# Patient Record
Sex: MALE | Race: WHITE | Employment: OTHER | URBAN - METROPOLITAN AREA
[De-identification: names, ages, dates, MRNs, and addresses within clinical notes are randomized per-mention and may not be internally consistent; named-entity substitution may affect disease eponyms.]

---

## 2024-05-31 ENCOUNTER — APPOINTMENT (OUTPATIENT)
Dept: CARDIOLOGY | Facility: HOSPITAL | Age: 79
End: 2024-05-31
Payer: MEDICARE

## 2024-05-31 ENCOUNTER — APPOINTMENT (OUTPATIENT)
Dept: RADIOLOGY | Facility: HOSPITAL | Age: 79
End: 2024-05-31
Payer: MEDICARE

## 2024-05-31 ENCOUNTER — HOSPITAL ENCOUNTER (EMERGENCY)
Facility: HOSPITAL | Age: 79
Discharge: OTHER INSTITUTION | End: 2024-06-02
Attending: EMERGENCY MEDICINE
Payer: MEDICARE

## 2024-05-31 DIAGNOSIS — Z96.649 PERIPROSTHETIC FRACTURE OF FEMUR FOLLOWING TOTAL REPLACEMENT OF HIP, INITIAL ENCOUNTER: Primary | ICD-10-CM

## 2024-05-31 DIAGNOSIS — M97.8XXA PERIPROSTHETIC FRACTURE OF FEMUR FOLLOWING TOTAL REPLACEMENT OF HIP, INITIAL ENCOUNTER: Primary | ICD-10-CM

## 2024-05-31 LAB
ANION GAP SERPL CALC-SCNC: 11 MMOL/L (ref 10–20)
BASOPHILS # BLD AUTO: 0.01 X10*3/UL (ref 0–0.1)
BASOPHILS NFR BLD AUTO: 0.3 %
BUN SERPL-MCNC: 30 MG/DL (ref 6–23)
CALCIUM SERPL-MCNC: 8.9 MG/DL (ref 8.6–10.3)
CHLORIDE SERPL-SCNC: 103 MMOL/L (ref 98–107)
CO2 SERPL-SCNC: 22 MMOL/L (ref 21–32)
CREAT SERPL-MCNC: 1.38 MG/DL (ref 0.5–1.3)
EGFRCR SERPLBLD CKD-EPI 2021: 53 ML/MIN/1.73M*2
EOSINOPHIL # BLD AUTO: 0.07 X10*3/UL (ref 0–0.4)
EOSINOPHIL NFR BLD AUTO: 2.3 %
ERYTHROCYTE [DISTWIDTH] IN BLOOD BY AUTOMATED COUNT: 13.2 % (ref 11.5–14.5)
GLUCOSE SERPL-MCNC: 128 MG/DL (ref 74–99)
HCT VFR BLD AUTO: 27.4 % (ref 41–52)
HGB BLD-MCNC: 9.6 G/DL (ref 13.5–17.5)
IMM GRANULOCYTES # BLD AUTO: 0.02 X10*3/UL (ref 0–0.5)
IMM GRANULOCYTES NFR BLD AUTO: 0.7 % (ref 0–0.9)
INR PPP: 1.1 (ref 0.9–1.1)
LYMPHOCYTES # BLD AUTO: 0.61 X10*3/UL (ref 0.8–3)
LYMPHOCYTES NFR BLD AUTO: 19.9 %
MCH RBC QN AUTO: 33.3 PG (ref 26–34)
MCHC RBC AUTO-ENTMCNC: 35 G/DL (ref 32–36)
MCV RBC AUTO: 95 FL (ref 80–100)
MONOCYTES # BLD AUTO: 0.28 X10*3/UL (ref 0.05–0.8)
MONOCYTES NFR BLD AUTO: 9.1 %
NEUTROPHILS # BLD AUTO: 2.08 X10*3/UL (ref 1.6–5.5)
NEUTROPHILS NFR BLD AUTO: 67.7 %
NRBC BLD-RTO: 0 /100 WBCS (ref 0–0)
PLATELET # BLD AUTO: 107 X10*3/UL (ref 150–450)
POTASSIUM SERPL-SCNC: 4.4 MMOL/L (ref 3.5–5.3)
PROTHROMBIN TIME: 12.2 SECONDS (ref 9.8–12.8)
RBC # BLD AUTO: 2.88 X10*6/UL (ref 4.5–5.9)
SODIUM SERPL-SCNC: 132 MMOL/L (ref 136–145)
WBC # BLD AUTO: 3.1 X10*3/UL (ref 4.4–11.3)

## 2024-05-31 PROCEDURE — 72170 X-RAY EXAM OF PELVIS: CPT | Performed by: RADIOLOGY

## 2024-05-31 PROCEDURE — 70450 CT HEAD/BRAIN W/O DYE: CPT | Performed by: RADIOLOGY

## 2024-05-31 PROCEDURE — 2500000004 HC RX 250 GENERAL PHARMACY W/ HCPCS (ALT 636 FOR OP/ED): Performed by: EMERGENCY MEDICINE

## 2024-05-31 PROCEDURE — 72125 CT NECK SPINE W/O DYE: CPT | Performed by: RADIOLOGY

## 2024-05-31 PROCEDURE — 2500000004 HC RX 250 GENERAL PHARMACY W/ HCPCS (ALT 636 FOR OP/ED): Performed by: STUDENT IN AN ORGANIZED HEALTH CARE EDUCATION/TRAINING PROGRAM

## 2024-05-31 PROCEDURE — 99291 CRITICAL CARE FIRST HOUR: CPT | Performed by: EMERGENCY MEDICINE

## 2024-05-31 PROCEDURE — 93005 ELECTROCARDIOGRAM TRACING: CPT

## 2024-05-31 PROCEDURE — 99291 CRITICAL CARE FIRST HOUR: CPT | Mod: 25

## 2024-05-31 PROCEDURE — 2500000005 HC RX 250 GENERAL PHARMACY W/O HCPCS: Performed by: EMERGENCY MEDICINE

## 2024-05-31 PROCEDURE — 85025 COMPLETE CBC W/AUTO DIFF WBC: CPT | Performed by: EMERGENCY MEDICINE

## 2024-05-31 PROCEDURE — 80048 BASIC METABOLIC PNL TOTAL CA: CPT | Performed by: EMERGENCY MEDICINE

## 2024-05-31 PROCEDURE — 36415 COLL VENOUS BLD VENIPUNCTURE: CPT | Performed by: EMERGENCY MEDICINE

## 2024-05-31 PROCEDURE — 73552 X-RAY EXAM OF FEMUR 2/>: CPT | Performed by: RADIOLOGY

## 2024-05-31 PROCEDURE — 2500000004 HC RX 250 GENERAL PHARMACY W/ HCPCS (ALT 636 FOR OP/ED)

## 2024-05-31 PROCEDURE — 96374 THER/PROPH/DIAG INJ IV PUSH: CPT

## 2024-05-31 PROCEDURE — 96376 TX/PRO/DX INJ SAME DRUG ADON: CPT

## 2024-05-31 PROCEDURE — 94660 CPAP INITIATION&MGMT: CPT

## 2024-05-31 PROCEDURE — 85610 PROTHROMBIN TIME: CPT | Performed by: EMERGENCY MEDICINE

## 2024-05-31 PROCEDURE — 73552 X-RAY EXAM OF FEMUR 2/>: CPT | Mod: LT

## 2024-05-31 PROCEDURE — 70450 CT HEAD/BRAIN W/O DYE: CPT

## 2024-05-31 PROCEDURE — 72170 X-RAY EXAM OF PELVIS: CPT

## 2024-05-31 PROCEDURE — 72125 CT NECK SPINE W/O DYE: CPT

## 2024-05-31 RX ORDER — MORPHINE SULFATE 4 MG/ML
4 INJECTION INTRAVENOUS ONCE
Status: COMPLETED | OUTPATIENT
Start: 2024-05-31 | End: 2024-05-31

## 2024-05-31 RX ORDER — MORPHINE SULFATE 4 MG/ML
INJECTION INTRAVENOUS
Status: COMPLETED
Start: 2024-05-31 | End: 2024-05-31

## 2024-05-31 RX ADMIN — MORPHINE SULFATE 4 MG: 4 INJECTION INTRAVENOUS at 13:31

## 2024-05-31 RX ADMIN — MORPHINE SULFATE 4 MG: 4 INJECTION INTRAVENOUS at 23:19

## 2024-05-31 RX ADMIN — MORPHINE SULFATE 4 MG: 4 INJECTION INTRAVENOUS at 17:55

## 2024-05-31 RX ADMIN — Medication 100 PERCENT: at 22:35

## 2024-05-31 RX ADMIN — Medication 100 PERCENT: at 19:50

## 2024-05-31 ASSESSMENT — PAIN - FUNCTIONAL ASSESSMENT
PAIN_FUNCTIONAL_ASSESSMENT: 0-10

## 2024-05-31 ASSESSMENT — COLUMBIA-SUICIDE SEVERITY RATING SCALE - C-SSRS
1. IN THE PAST MONTH, HAVE YOU WISHED YOU WERE DEAD OR WISHED YOU COULD GO TO SLEEP AND NOT WAKE UP?: NO
6. HAVE YOU EVER DONE ANYTHING, STARTED TO DO ANYTHING, OR PREPARED TO DO ANYTHING TO END YOUR LIFE?: NO
2. HAVE YOU ACTUALLY HAD ANY THOUGHTS OF KILLING YOURSELF?: NO

## 2024-05-31 ASSESSMENT — PAIN DESCRIPTION - LOCATION
LOCATION: HIP
LOCATION: LEG
LOCATION: HIP

## 2024-05-31 ASSESSMENT — PAIN SCALES - PAIN ASSESSMENT IN ADVANCED DEMENTIA (PAINAD): TOTALSCORE: EMOTIONAL SUPPORT;MEDICATION (SEE MAR)

## 2024-05-31 ASSESSMENT — PAIN SCALES - GENERAL
PAINLEVEL_OUTOF10: 5 - MODERATE PAIN
PAINLEVEL_OUTOF10: 2
PAINLEVEL_OUTOF10: 5 - MODERATE PAIN
PAINLEVEL_OUTOF10: 7
PAINLEVEL_OUTOF10: 2

## 2024-05-31 ASSESSMENT — LIFESTYLE VARIABLES
HAVE PEOPLE ANNOYED YOU BY CRITICIZING YOUR DRINKING: NO
HAVE YOU EVER FELT YOU SHOULD CUT DOWN ON YOUR DRINKING: NO
EVER HAD A DRINK FIRST THING IN THE MORNING TO STEADY YOUR NERVES TO GET RID OF A HANGOVER: NO
TOTAL SCORE: 0
EVER FELT BAD OR GUILTY ABOUT YOUR DRINKING: NO

## 2024-05-31 ASSESSMENT — PAIN DESCRIPTION - FREQUENCY: FREQUENCY: CONSTANT/CONTINUOUS

## 2024-05-31 ASSESSMENT — PAIN DESCRIPTION - ORIENTATION: ORIENTATION: LEFT

## 2024-05-31 NOTE — ED PROVIDER NOTES
HPI   Chief Complaint   Patient presents with    Fall    Hip Pain       Patient presents after a fall.  He states he was out doing some weeding/gardening when he tripped and lost his balance and fell.  He landed on his left side.  He did hit his head.  No LOC.  He is on the blood thinning medications.  He sustained a hip injury with this.  He was unable to ambulate after the fall.  He states he has had a left hip replacement that was done at [facility] many years ago.  He currently denies any head or neck pain.  EMS was called and placed him in a cervical collar.  He denies any dizziness or lightheadedness before the fall.  No chest pain or shortness of breath.                        [place] Coma Scale Score: 15                  Patient History   No past medical history on file.  No past surgical history on file.  No family history on file.  Social History     Tobacco Use    Smoking status: Not on file    Smokeless tobacco: Not on file   Substance Use Topics    Alcohol use: Not on file    Drug use: Not on file       Physical Exam   ED Triage Vitals [05/31/24 1312]   Temperature Heart Rate Respirations BP   37.1 °C (98.8 °F) 74 20 167/67      Pulse Ox Temp Source Heart Rate Source Patient Position   100 % Temporal Monitor --      BP Location FiO2 (%)     Left arm --       Physical Exam  Vitals and nursing note reviewed.   Constitutional:       Appearance: Normal appearance.   HENT:      Head: Normocephalic and atraumatic.      Mouth/Throat:      Mouth: Mucous membranes are moist.   Eyes:      Extraocular Movements: Extraocular movements intact.      Pupils: Pupils are equal, round, and reactive to light.   Neck:      Comments: Cervical collar in place  Cardiovascular:      Rate and Rhythm: Normal rate and regular rhythm.      Heart sounds: No murmur heard.  Pulmonary:      Effort: Pulmonary effort is normal. No respiratory distress.      Breath sounds: Normal breath sounds.   Abdominal:      General: There is  no distension.      Palpations: Abdomen is soft.      Tenderness: There is no abdominal tenderness.   Musculoskeletal:         General: Tenderness (Mid to proximal femur on left) present. No deformity.      Right lower leg: No edema.      Left lower leg: No edema.      Comments: Left leg is shortened and internally rotated.  He has limited range of motion secondary to the pain.  No cervical spine tenderness   Skin:     General: Skin is warm and dry.      Findings: No lesion or rash.   Neurological:      General: No focal deficit present.      Mental Status: He is alert and oriented to person, place, and time.      Sensory: No sensory deficit.      Motor: No weakness.   Psychiatric:         Behavior: Behavior normal.     Labs Reviewed - No data to display  CT head wo IV contrast    (Results Pending)   CT cervical spine wo IV contrast    (Results Pending)   XR hip left with pelvis when performed 2 or 3 views    (Results Pending)   XR femur left 2+ views    (Results Pending)     ED Course & MDM   Diagnoses as of 05/31/24 1509   Periprosthetic fracture of femur following total replacement of hip, initial encounter       Medical Decision Making  Differentials include left hip fracture versus dislocation, left femur fracture, head injury, cervical spine fracture.  Imaging studies, if performed, were independently reviewed and interpreted by myself and confirmed by radiologist. EKG(s), if performed, were interpreted by myself.The patient was given morphine for pain control.  CBC showed a white count 3.1 with a hemoglobin 9.6.  X-rays of the left femur and pelvis shows a comminuted spiral fracture of the proximal left femur beginning in the subtrochanteric region extending into the femoral shaft with splaying of the fracture fragments and originating at the level of the femoral component of this left hip arthroplasty.  I discussed with Dr. Mccarthy with orthopedics here he feels the patient needs a higher level of care.   Family wants to go to the [facility] as he has had the surgeries there is previous orthopedic surgeries there.  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  I talked with the [facility] and the patient was accepted there by Dr. Prince.  They hope that they will get a bed for him this evening.  I did obtain CAT scans of his head and cervical spine which showed nothing acute.  His c-collar was cleared by myself.  EKG was sinus rhythm at a rate of 80.  No acute ischemic changes.  QTc 470, MA interval 170.        Procedure  Critical Care    Performed by: Asim Prado MD  Authorized by: Asim Prado MD    Critical care provider statement:     Critical care time (minutes):  30    Critical care time was exclusive of:  Separately billable procedures and treating other patients    Critical care was necessary to treat or prevent imminent or life-threatening deterioration of the following conditions:  Trauma    Critical care was time spent personally by me on the following activities:  Ordering and performing treatments and interventions, ordering and review of laboratory studies, ordering and review of radiographic studies, discussions with consultants, evaluation of patient's response to treatment, examination of patient and re-evaluation of patient's condition    Care discussed with: accepting provider at another facility         Asim Prado MD  05/31/24 1508       Asim Prado MD  05/31/24 2533

## 2024-06-01 LAB
ANION GAP SERPL CALC-SCNC: 12 MMOL/L (ref 10–20)
ATRIAL RATE: 82 BPM
BUN SERPL-MCNC: 35 MG/DL (ref 6–23)
CALCIUM SERPL-MCNC: 8.5 MG/DL (ref 8.6–10.3)
CHLORIDE SERPL-SCNC: 104 MMOL/L (ref 98–107)
CK SERPL-CCNC: 349 U/L (ref 0–325)
CO2 SERPL-SCNC: 21 MMOL/L (ref 21–32)
CREAT SERPL-MCNC: 1.61 MG/DL (ref 0.5–1.3)
EGFRCR SERPLBLD CKD-EPI 2021: 44 ML/MIN/1.73M*2
ERYTHROCYTE [DISTWIDTH] IN BLOOD BY AUTOMATED COUNT: 13.5 % (ref 11.5–14.5)
GLUCOSE SERPL-MCNC: 131 MG/DL (ref 74–99)
HCT VFR BLD AUTO: 22.8 % (ref 41–52)
HGB BLD-MCNC: 7.7 G/DL (ref 13.5–17.5)
LACTATE SERPL-SCNC: 1.1 MMOL/L (ref 0.4–2)
MCH RBC QN AUTO: 33 PG (ref 26–34)
MCHC RBC AUTO-ENTMCNC: 33.8 G/DL (ref 32–36)
MCV RBC AUTO: 98 FL (ref 80–100)
NRBC BLD-RTO: 0 /100 WBCS (ref 0–0)
P AXIS: 78 DEGREES
PLATELET # BLD AUTO: 76 X10*3/UL (ref 150–450)
POTASSIUM SERPL-SCNC: 4.9 MMOL/L (ref 3.5–5.3)
PR INTERVAL: 170 MS
Q ONSET: 252 MS
QRS COUNT: 13 BEATS
QRS DURATION: 101 MS
QT INTERVAL: 407 MS
QTC CALCULATION(BAZETT): 470 MS
QTC FREDERICIA: 448 MS
R AXIS: -38 DEGREES
RBC # BLD AUTO: 2.33 X10*6/UL (ref 4.5–5.9)
SODIUM SERPL-SCNC: 132 MMOL/L (ref 136–145)
T AXIS: 74 DEGREES
T OFFSET: 456 MS
VENTRICULAR RATE: 80 BPM
WBC # BLD AUTO: 3.4 X10*3/UL (ref 4.4–11.3)

## 2024-06-01 PROCEDURE — 2500000004 HC RX 250 GENERAL PHARMACY W/ HCPCS (ALT 636 FOR OP/ED)

## 2024-06-01 PROCEDURE — 2500000004 HC RX 250 GENERAL PHARMACY W/ HCPCS (ALT 636 FOR OP/ED): Performed by: STUDENT IN AN ORGANIZED HEALTH CARE EDUCATION/TRAINING PROGRAM

## 2024-06-01 PROCEDURE — 82550 ASSAY OF CK (CPK): CPT | Performed by: EMERGENCY MEDICINE

## 2024-06-01 PROCEDURE — 83605 ASSAY OF LACTIC ACID: CPT | Performed by: EMERGENCY MEDICINE

## 2024-06-01 PROCEDURE — 96376 TX/PRO/DX INJ SAME DRUG ADON: CPT

## 2024-06-01 PROCEDURE — 36415 COLL VENOUS BLD VENIPUNCTURE: CPT | Performed by: EMERGENCY MEDICINE

## 2024-06-01 PROCEDURE — 2500000005 HC RX 250 GENERAL PHARMACY W/O HCPCS: Performed by: EMERGENCY MEDICINE

## 2024-06-01 PROCEDURE — 94660 CPAP INITIATION&MGMT: CPT

## 2024-06-01 PROCEDURE — 2500000004 HC RX 250 GENERAL PHARMACY W/ HCPCS (ALT 636 FOR OP/ED): Performed by: EMERGENCY MEDICINE

## 2024-06-01 PROCEDURE — 80048 BASIC METABOLIC PNL TOTAL CA: CPT | Performed by: EMERGENCY MEDICINE

## 2024-06-01 PROCEDURE — 85027 COMPLETE CBC AUTOMATED: CPT | Performed by: EMERGENCY MEDICINE

## 2024-06-01 RX ORDER — MORPHINE SULFATE 4 MG/ML
4 INJECTION INTRAVENOUS EVERY 4 HOURS PRN
Status: DISCONTINUED | OUTPATIENT
Start: 2024-06-01 | End: 2024-06-01

## 2024-06-01 RX ORDER — MORPHINE SULFATE 4 MG/ML
INJECTION INTRAVENOUS
Status: COMPLETED
Start: 2024-06-01 | End: 2024-06-01

## 2024-06-01 RX ORDER — MORPHINE SULFATE 4 MG/ML
4 INJECTION INTRAVENOUS EVERY 4 HOURS PRN
Status: DISCONTINUED | OUTPATIENT
Start: 2024-06-01 | End: 2024-06-03 | Stop reason: HOSPADM

## 2024-06-01 RX ORDER — MORPHINE SULFATE 4 MG/ML
4 INJECTION INTRAVENOUS ONCE
Status: COMPLETED | OUTPATIENT
Start: 2024-06-01 | End: 2024-06-01

## 2024-06-01 RX ADMIN — MORPHINE SULFATE 4 MG: 4 INJECTION INTRAVENOUS at 19:23

## 2024-06-01 RX ADMIN — MORPHINE SULFATE 4 MG: 4 INJECTION INTRAVENOUS at 10:17

## 2024-06-01 RX ADMIN — Medication 80 PERCENT: at 03:27

## 2024-06-01 RX ADMIN — MORPHINE SULFATE 4 MG: 4 INJECTION INTRAVENOUS at 06:01

## 2024-06-01 ASSESSMENT — PAIN SCALES - GENERAL
PAINLEVEL_OUTOF10: 2
PAINLEVEL_OUTOF10: 2
PAINLEVEL_OUTOF10: 6
PAINLEVEL_OUTOF10: 0 - NO PAIN
PAINLEVEL_OUTOF10: 2
PAINLEVEL_OUTOF10: 2
PAINLEVEL_OUTOF10: 4
PAINLEVEL_OUTOF10: 8

## 2024-06-01 ASSESSMENT — PAIN SCALES - PAIN ASSESSMENT IN ADVANCED DEMENTIA (PAINAD): TOTALSCORE: MEDICATION (SEE MAR)

## 2024-06-01 ASSESSMENT — PAIN DESCRIPTION - PAIN TYPE: TYPE: ACUTE PAIN

## 2024-06-01 ASSESSMENT — PAIN - FUNCTIONAL ASSESSMENT
PAIN_FUNCTIONAL_ASSESSMENT: 0-10

## 2024-06-01 ASSESSMENT — PAIN DESCRIPTION - PROGRESSION: CLINICAL_PROGRESSION: RAPIDLY IMPROVING

## 2024-06-01 ASSESSMENT — PAIN DESCRIPTION - ORIENTATION: ORIENTATION: LEFT

## 2024-06-01 ASSESSMENT — PAIN DESCRIPTION - LOCATION: LOCATION: LEG

## 2024-06-01 ASSESSMENT — PAIN DESCRIPTION - DESCRIPTORS: DESCRIPTORS: ACHING

## 2024-06-02 VITALS
RESPIRATION RATE: 16 BRPM | DIASTOLIC BLOOD PRESSURE: 62 MMHG | SYSTOLIC BLOOD PRESSURE: 117 MMHG | BODY MASS INDEX: 25.73 KG/M2 | OXYGEN SATURATION: 97 % | TEMPERATURE: 98.4 F | HEIGHT: 72 IN | HEART RATE: 75 BPM | WEIGHT: 190 LBS

## 2024-06-02 LAB
ABO GROUP (TYPE) IN BLOOD: NORMAL
ABO GROUP (TYPE) IN BLOOD: NORMAL
ANION GAP SERPL CALC-SCNC: 12 MMOL/L (ref 10–20)
ANTIBODY SCREEN: NORMAL
BLOOD EXPIRATION DATE: NORMAL
BUN SERPL-MCNC: 46 MG/DL (ref 6–23)
CALCIUM SERPL-MCNC: 8.1 MG/DL (ref 8.6–10.3)
CHLORIDE SERPL-SCNC: 104 MMOL/L (ref 98–107)
CK SERPL-CCNC: 522 U/L (ref 0–325)
CO2 SERPL-SCNC: 19 MMOL/L (ref 21–32)
CREAT SERPL-MCNC: 1.81 MG/DL (ref 0.5–1.3)
DISPENSE STATUS: NORMAL
EGFRCR SERPLBLD CKD-EPI 2021: 38 ML/MIN/1.73M*2
ERYTHROCYTE [DISTWIDTH] IN BLOOD BY AUTOMATED COUNT: 13.5 % (ref 11.5–14.5)
FERRITIN SERPL-MCNC: 331 NG/ML (ref 20–300)
GLUCOSE SERPL-MCNC: 129 MG/DL (ref 74–99)
HCT VFR BLD AUTO: 19.3 % (ref 41–52)
HCT VFR BLD AUTO: 20.8 % (ref 41–52)
HGB BLD-MCNC: 6.7 G/DL (ref 13.5–17.5)
HGB BLD-MCNC: 7.2 G/DL (ref 13.5–17.5)
IRON SATN MFR SERPL: 16 % (ref 25–45)
IRON SERPL-MCNC: 39 UG/DL (ref 35–150)
MCH RBC QN AUTO: 33.5 PG (ref 26–34)
MCHC RBC AUTO-ENTMCNC: 34.7 G/DL (ref 32–36)
MCV RBC AUTO: 97 FL (ref 80–100)
NRBC BLD-RTO: 0 /100 WBCS (ref 0–0)
PLATELET # BLD AUTO: 75 X10*3/UL (ref 150–450)
POTASSIUM SERPL-SCNC: 4.8 MMOL/L (ref 3.5–5.3)
PRODUCT BLOOD TYPE: 5100
PRODUCT CODE: NORMAL
RBC # BLD AUTO: 2 X10*6/UL (ref 4.5–5.9)
RH FACTOR (ANTIGEN D): NORMAL
RH FACTOR (ANTIGEN D): NORMAL
SODIUM SERPL-SCNC: 130 MMOL/L (ref 136–145)
TIBC SERPL-MCNC: 246 UG/DL (ref 240–445)
UIBC SERPL-MCNC: 207 UG/DL (ref 110–370)
UNIT ABO: NORMAL
UNIT NUMBER: NORMAL
UNIT RH: NORMAL
UNIT VOLUME: 350
WBC # BLD AUTO: 3.8 X10*3/UL (ref 4.4–11.3)
XM INTEP: NORMAL

## 2024-06-02 PROCEDURE — 96376 TX/PRO/DX INJ SAME DRUG ADON: CPT

## 2024-06-02 PROCEDURE — 96361 HYDRATE IV INFUSION ADD-ON: CPT

## 2024-06-02 PROCEDURE — 85014 HEMATOCRIT: CPT | Mod: 59 | Performed by: EMERGENCY MEDICINE

## 2024-06-02 PROCEDURE — 86901 BLOOD TYPING SEROLOGIC RH(D): CPT | Performed by: EMERGENCY MEDICINE

## 2024-06-02 PROCEDURE — 86920 COMPATIBILITY TEST SPIN: CPT

## 2024-06-02 PROCEDURE — P9016 RBC LEUKOCYTES REDUCED: HCPCS

## 2024-06-02 PROCEDURE — 85027 COMPLETE CBC AUTOMATED: CPT | Performed by: EMERGENCY MEDICINE

## 2024-06-02 PROCEDURE — 36430 TRANSFUSION BLD/BLD COMPNT: CPT

## 2024-06-02 PROCEDURE — 82728 ASSAY OF FERRITIN: CPT | Performed by: NURSE PRACTITIONER

## 2024-06-02 PROCEDURE — 99222 1ST HOSP IP/OBS MODERATE 55: CPT | Performed by: NURSE PRACTITIONER

## 2024-06-02 PROCEDURE — 2500000004 HC RX 250 GENERAL PHARMACY W/ HCPCS (ALT 636 FOR OP/ED): Performed by: STUDENT IN AN ORGANIZED HEALTH CARE EDUCATION/TRAINING PROGRAM

## 2024-06-02 PROCEDURE — 96372 THER/PROPH/DIAG INJ SC/IM: CPT | Performed by: NURSE PRACTITIONER

## 2024-06-02 PROCEDURE — 2500000004 HC RX 250 GENERAL PHARMACY W/ HCPCS (ALT 636 FOR OP/ED): Performed by: EMERGENCY MEDICINE

## 2024-06-02 PROCEDURE — 80048 BASIC METABOLIC PNL TOTAL CA: CPT | Performed by: EMERGENCY MEDICINE

## 2024-06-02 PROCEDURE — 82550 ASSAY OF CK (CPK): CPT | Performed by: NURSE PRACTITIONER

## 2024-06-02 PROCEDURE — 96375 TX/PRO/DX INJ NEW DRUG ADDON: CPT

## 2024-06-02 PROCEDURE — 36415 COLL VENOUS BLD VENIPUNCTURE: CPT | Performed by: EMERGENCY MEDICINE

## 2024-06-02 PROCEDURE — 83540 ASSAY OF IRON: CPT | Performed by: NURSE PRACTITIONER

## 2024-06-02 PROCEDURE — 2500000004 HC RX 250 GENERAL PHARMACY W/ HCPCS (ALT 636 FOR OP/ED): Performed by: NURSE PRACTITIONER

## 2024-06-02 RX ORDER — ENOXAPARIN SODIUM 100 MG/ML
40 INJECTION SUBCUTANEOUS EVERY 24 HOURS
Status: DISCONTINUED | OUTPATIENT
Start: 2024-06-02 | End: 2024-06-03 | Stop reason: HOSPADM

## 2024-06-02 RX ORDER — ONDANSETRON HYDROCHLORIDE 2 MG/ML
4 INJECTION, SOLUTION INTRAVENOUS ONCE
Status: COMPLETED | OUTPATIENT
Start: 2024-06-02 | End: 2024-06-02

## 2024-06-02 RX ORDER — SODIUM CHLORIDE 9 MG/ML
100 INJECTION, SOLUTION INTRAVENOUS CONTINUOUS
Status: DISCONTINUED | OUTPATIENT
Start: 2024-06-02 | End: 2024-06-03 | Stop reason: HOSPADM

## 2024-06-02 RX ADMIN — MORPHINE SULFATE 4 MG: 4 INJECTION INTRAVENOUS at 20:53

## 2024-06-02 RX ADMIN — ONDANSETRON 4 MG: 2 INJECTION INTRAMUSCULAR; INTRAVENOUS at 02:18

## 2024-06-02 RX ADMIN — ENOXAPARIN SODIUM 40 MG: 40 INJECTION SUBCUTANEOUS at 13:11

## 2024-06-02 RX ADMIN — SODIUM CHLORIDE 100 ML/HR: 9 INJECTION, SOLUTION INTRAVENOUS at 06:46

## 2024-06-02 RX ADMIN — MORPHINE SULFATE 4 MG: 4 INJECTION INTRAVENOUS at 16:47

## 2024-06-02 ASSESSMENT — ENCOUNTER SYMPTOMS
POLYDIPSIA: 0
NERVOUS/ANXIOUS: 0
BACK PAIN: 1
CHOKING: 0
MYALGIAS: 0
VOMITING: 0
PALPITATIONS: 0
NECK PAIN: 0
DYSPHORIC MOOD: 0
CHILLS: 0
BRUISES/BLEEDS EASILY: 0
ABDOMINAL DISTENTION: 0
FREQUENCY: 0
APNEA: 0
FATIGUE: 0
BLOOD IN STOOL: 0
POLYPHAGIA: 0
NUMBNESS: 0
SEIZURES: 0
FEVER: 0
TROUBLE SWALLOWING: 0
ARTHRALGIAS: 1
UNEXPECTED WEIGHT CHANGE: 0
VOICE CHANGE: 0
CONFUSION: 0
WEAKNESS: 0
FLANK PAIN: 0
SINUS PAIN: 0
COLOR CHANGE: 0
LIGHT-HEADEDNESS: 0
COUGH: 0
SLEEP DISTURBANCE: 0
CHEST TIGHTNESS: 0
PHOTOPHOBIA: 0
HEADACHES: 0
DYSURIA: 0
EYE ITCHING: 0
CONSTIPATION: 0
SORE THROAT: 0
EYE DISCHARGE: 0
DIARRHEA: 0
APPETITE CHANGE: 0
TREMORS: 0
SPEECH DIFFICULTY: 0
ABDOMINAL PAIN: 0
DIZZINESS: 0
SHORTNESS OF BREATH: 0
NECK STIFFNESS: 0
WHEEZING: 0
ADENOPATHY: 0
DIFFICULTY URINATING: 0
HALLUCINATIONS: 0
NAUSEA: 0
WOUND: 0
HEMATURIA: 0
EYE PAIN: 0

## 2024-06-02 ASSESSMENT — PAIN - FUNCTIONAL ASSESSMENT
PAIN_FUNCTIONAL_ASSESSMENT: 0-10
PAIN_FUNCTIONAL_ASSESSMENT: 0-10

## 2024-06-02 ASSESSMENT — PAIN SCALES - GENERAL
PAINLEVEL_OUTOF10: 0 - NO PAIN
PAINLEVEL_OUTOF10: 8
PAINLEVEL_OUTOF10: 0 - NO PAIN

## 2024-06-02 NOTE — CONSULTS
Consults    Reason For Consult  Internal medicine consult    History Obtained From: Patient    History Of Present Illness:  Ramone Tate is a 78 y.o. male with PMHx s/f coronary artery disease with PCI and stent hypertension chronic kidney disease stage III obstructive sleep apnea on CPAP therapy prostate cancer with bone metastasis  presenting with left femur fracture.  The patient had presented to the emergency department on May 31 due to a fall he has a prior history of prostate cancer with bone metastasis as well as a left hip replacement in his left hip.  On evaluation in emergency department he was found to have a spiral fracture of the left hip.  The ED physician consulted with Dr. Mccarthy from orthopedic surgery who felt the patient would require a higher level of care.  The patient does get all of his care at the [facility] contact was made with Dr. Prince who accepted the patient.  Unfortunately the patient has not been able to transfer and due to the delay in his transfer internal medicine send consult is requested to help manage the patient's multiple chronic medical conditions.  The patient reports is history of diabetes is such that he is no longer taking any hypoglycemic agents due to weight loss and his A1c is typically in the low fives.  He recently completed his chemotherapy treatments in January of this year.  He denies any chest pains or shortness of breath doing things he did not have any syncopal episode or hit his head.  The patient's most recent blood work shows sodium 130 bicarb is 19 creatinine 1.81 BUN is 46 creatinine kinase 349 a CBC of white blood cells 3.8 hemoglobin 6.7 hematocrit 19.3 platelets 75.  A CT scan of his head was negative for intracranial hemorrhage or any evidence of acute cortical infarct.  A CT scan of the neck showed no gross mass or lymphadenopathy no acute fracture.  X-ray of the femur shows an acute comminuted spiral fracture of the left proximal femur.   The patient tells me he has been eating and drinking poorly since Friday, he was  been started on normal saline for his low sodium and acute kidney injury, also he has had some drift downward in his hemoglobin will receive 1 unit of packed red blood cells from the emergency department provider.          ED Course:  ED Course as of 06/02/24 1047   Sat Jun 01, 2024   1000 HEMOGLOBIN(!): 7.7  Patient's with almost 2 g drop in hemoglobin since yesterday.  Patient evaluated for thigh compartment is firm and tender.  Patient has good distal pulses. []   1042 Creatine Kinase(!): 349 []   1042 Lactate: 1.1 []   1042 CK and lactate are reassuring  []      ED Course User Index  [] Norman Brunson MD         Diagnoses as of 06/02/24 1047   Periprosthetic fracture of femur following total replacement of hip, initial encounter     Relevant Results  Results for orders placed or performed during the hospital encounter of 05/31/24 (from the past 24 hour(s))   Basic metabolic panel   Result Value Ref Range    Glucose 129 (H) 74 - 99 mg/dL    Sodium 130 (L) 136 - 145 mmol/L    Potassium 4.8 3.5 - 5.3 mmol/L    Chloride 104 98 - 107 mmol/L    Bicarbonate 19 (L) 21 - 32 mmol/L    Anion Gap 12 10 - 20 mmol/L    Urea Nitrogen 46 (H) 6 - 23 mg/dL    Creatinine 1.81 (H) 0.50 - 1.30 mg/dL    eGFR 38 (L) >60 mL/min/1.73m*2    Calcium 8.1 (L) 8.6 - 10.3 mg/dL   CBC   Result Value Ref Range    WBC 3.8 (L) 4.4 - 11.3 x10*3/uL    nRBC 0.0 0.0 - 0.0 /100 WBCs    RBC 2.00 (L) 4.50 - 5.90 x10*6/uL    Hemoglobin 6.7 (L) 13.5 - 17.5 g/dL    Hematocrit 19.3 (L) 41.0 - 52.0 %    MCV 97 80 - 100 fL    MCH 33.5 26.0 - 34.0 pg    MCHC 34.7 32.0 - 36.0 g/dL    RDW 13.5 11.5 - 14.5 %    Platelets 75 (L) 150 - 450 x10*3/uL      ECG 12 lead    Result Date: 6/1/2024  Sinus rhythm Left axis deviation Abnormal R-wave progression, early transition See ED provider note for full interpretation and clinical correlation Confirmed by Jeanna Villanueva ([id number]) on  6/1/2024 9:14:50 PM    CT cervical spine wo IV contrast    Result Date: 5/31/2024  Interpreted By:  Jair Flores, STUDY: CT CERVICAL SPINE WO IV CONTRAST; 5/31/2024 2:49 pm   INDICATION: Signs/Symptoms:fall.   COMPARISON: None.   ACCESSION NUMBER(S): [id number]   ORDERING CLINICIAN: JENNIFFER DUNN   TECHNIQUE: Contiguous axial CT images were obtained at  2 mm slice thickness through the cervical spine without contrast administration. The images were then reconstructed in the coronal and sagittal planes.   FINDINGS: There is no CT evidence of acute fracture identified. No prevertebral soft tissue swelling is identified.   ALIGNMENT: There is minimal retrolisthesis of C3 on C4.   VERTEBRAE/DISC SPACES: Moderate to severe disc space narrowing and moderate marginal osteophyte formation is seen at the C4-5 level. Mild-to-moderate discogenic degenerative changes are seen at the C5-6 level. Mild discogenic degenerative changes are seen at the remaining disc space levels. Mild facet degenerative changes are seen elsewhere in the cervical spine.   C2-3: There is no significant neural foraminal or central canal narrowing identified.   C3-4: Moderate right-sided and mild left-sided neural foraminal narrowing is seen. No significant central canal narrowing is seen.   C4-5: Severe right-sided and moderate to severe left-sided neural foraminal narrowing is seen. No significant central canal narrowing is seen.   C5-6: Moderate to severe neural foraminal narrowing is seen bilaterally. No significant central canal narrowing is seen.   C6-7: Mild right-sided and moderate left-sided neural foraminal narrowing is seen. No significant central canal narrowing is seen.   C7-T1: There is no significant neural foraminal or central canal narrowing identified.   ADDITIONAL FINDINGS: Evaluation of the visualized soft tissues of the neck is limited by the lack of intravenous contrast.  Within this limitation, no gross mass or lymphadenopathy is  identified.       1.  No CT evidence of acute fracture. 2.  Degenerative changes throughout the cervical spine, as above.   Signed by: Jair Flores 5/31/2024 2:54 PM Dictation workstation:   [id number]    CT head wo IV contrast    Result Date: 5/31/2024  Interpreted By:  Jair Flores, STUDY: CT HEAD WO IV CONTRAST; 5/31/2024 2:49 pm   INDICATION: Signs/Symptoms:fall.   COMPARISON: None.   ACCESSION NUMBER(S): [id number]   ORDERING CLINICIAN: JENNIFFER DUNN   TECHNIQUE: Contiguous axial CT images were obtained through the head at 5 mm slice thickness without contrast administration.   FINDINGS: INTRACRANIAL: The ventricles, sulci and basal cisterns are within normal limits for size and configuration. The grey-white differentiation is intact. There is no mass effect or midline shift. There is no extraaxial fluid collection. There is no intracranial hemorrhage.  The calvarium is unremarkable.   EXTRACRANIAL: Visualized paranasal sinuses and mastoids are clear.       No evidence of acute cortical infarct or intracranial hemorrhage.   MACRO: None     Signed by: Jair Flores 5/31/2024 2:51 PM Dictation workstation:   [id number]    XR femur left 2+ views    Result Date: 5/31/2024  Interpreted By:  Vivi Azevedo, STUDY: XR PELVIS 1-2 VIEWS; XR FEMUR LEFT 2+ VIEWS 5/31/2024 2:03 pm   INDICATION: Signs/Symptoms:fall with hip pain   COMPARISON: None available.   ACCESSION NUMBER(S): [id number]; [id number]   ORDERING CLINICIAN: JENNIFFER DUNN   TECHNIQUE: AP view of the pelvis is completed with two views of left femur also performed.   FINDINGS: There is postoperative change from bilateral bipolar hip arthroplasty with 2 cerclage wires encircling the proximal left femur. There is an acute 3 part spiral fracture of the proximal left femur extending from the subtrochanteric region into the proximal femoral shaft terminating distally at the junction of the proximal and mid thirds of the femoral shaft. There is angulation at  fracture site with apex directed laterally. There is also splaying of the fracture fragments with the mid and distal femoral shaft displaced anteriorly and medially with respect to the proximal fracture fragments.   The pelvic ring is intact. There are multiple surgical clips within the pelvis.       There is an acute comminuted spiral fracture of the proximal left femur beginning in the subtrochanteric region and extending into the femoral shaft as outlined above with splaying of the fracture fragments, angulation, and displacement. The fracture originates at the level of the femoral component of the bipolar left hip arthroplasty.       Signed by: Vivi Azevedo 5/31/2024 2:22 PM Dictation workstation:   [id number]    XR pelvis 1-2 views    Result Date: 5/31/2024  Interpreted By:  Vivi Azevedo, STUDY: XR PELVIS 1-2 VIEWS; XR FEMUR LEFT 2+ VIEWS 5/31/2024 2:03 pm   INDICATION: Signs/Symptoms:fall with hip pain   COMPARISON: None available.   ACCESSION NUMBER(S): [id number]; [id number]   ORDERING CLINICIAN: JENNIFFER DUNN   TECHNIQUE: AP view of the pelvis is completed with two views of left femur also performed.   FINDINGS: There is postoperative change from bilateral bipolar hip arthroplasty with 2 cerclage wires encircling the proximal left femur. There is an acute 3 part spiral fracture of the proximal left femur extending from the subtrochanteric region into the proximal femoral shaft terminating distally at the junction of the proximal and mid thirds of the femoral shaft. There is angulation at fracture site with apex directed laterally. There is also splaying of the fracture fragments with the mid and distal femoral shaft displaced anteriorly and medially with respect to the proximal fracture fragments.   The pelvic ring is intact. There are multiple surgical clips within the pelvis.       There is an acute comminuted spiral fracture of the proximal left femur beginning in the subtrochanteric region and extending  into the femoral shaft as outlined above with splaying of the fracture fragments, angulation, and displacement. The fracture originates at the level of the femoral component of the bipolar left hip arthroplasty.       Signed by: Vivi Azveedo 5/31/2024 2:22 PM Dictation workstation:   [id number]     Scheduled medications:     Continuous medications:  sodium chloride 0.9%, 100 mL/hr, Last Rate: 100 mL/hr (06/02/24 0646)      PRN medications:  PRN medications: morphine, oxygen      Past Medical History  Prostate cancer with bone metastasis, coronary artery disease history of PCI and stent, hypertension, chronic kidney disease stage IIIa, hypertension, dyslipidemia, anemia of chronic disease, thyroid nodules obstructive sleep apnea, low back pain with radiculopathy, thrombocytopenia.    Surgical History  Prior PCI with stenting, prior left total hip arthroplasty     Social History  He has no history on file for tobacco use, alcohol use, and drug use.    Family History  No family history on file.     Allergies  Iodine    Code Status  No Order     Review of Systems   Constitutional:  Negative for appetite change, chills, fatigue, fever and unexpected weight change.   HENT:  Negative for congestion, ear discharge, ear pain, mouth sores, nosebleeds, sinus pain, sore throat, trouble swallowing and voice change.    Eyes:  Negative for photophobia, pain, discharge, itching and visual disturbance.   Respiratory:  Negative for apnea, cough, choking, chest tightness, shortness of breath and wheezing.    Cardiovascular:  Negative for chest pain, palpitations and leg swelling.   Gastrointestinal:  Negative for abdominal distention, abdominal pain, blood in stool, constipation, diarrhea, nausea and vomiting.   Endocrine: Negative for cold intolerance, heat intolerance, polydipsia, polyphagia and polyuria.   Genitourinary:  Negative for decreased urine volume, difficulty urinating, dysuria, flank pain, frequency, hematuria and  urgency.   Musculoskeletal:  Positive for arthralgias and back pain. Negative for gait problem, myalgias, neck pain and neck stiffness.   Skin:  Negative for color change, pallor and wound.   Allergic/Immunologic: Negative for food allergies and immunocompromised state.   Neurological:  Negative for dizziness, tremors, seizures, syncope, speech difficulty, weakness, light-headedness, numbness and headaches.   Hematological:  Negative for adenopathy. Does not bruise/bleed easily.   Psychiatric/Behavioral:  Negative for confusion, dysphoric mood, hallucinations, sleep disturbance and suicidal ideas. The patient is not nervous/anxious.        Last Recorded Vitals  /63   Pulse 86   Temp 37.1 °C (98.8 °F) (Temporal)   Resp 18   Wt 86.2 kg (190 lb)   SpO2 99%      Physical Exam  Vitals reviewed.   Constitutional:       General: He is not in acute distress.  HENT:      Head: Normocephalic and atraumatic.      Right Ear: External ear normal.      Left Ear: External ear normal.      Nose: Nose normal.      Mouth/Throat:      Mouth: Mucous membranes are moist.      Pharynx: Oropharynx is clear.   Eyes:      Extraocular Movements: Extraocular movements intact.      Conjunctiva/sclera: Conjunctivae normal.      Pupils: Pupils are equal, round, and reactive to light.   Neck:      Vascular: No carotid bruit.   Cardiovascular:      Rate and Rhythm: Normal rate and regular rhythm.      Pulses: Normal pulses.      Heart sounds: Normal heart sounds. No murmur heard.  Pulmonary:      Effort: Pulmonary effort is normal. No respiratory distress.      Breath sounds: Normal breath sounds. No wheezing, rhonchi or rales.   Chest:      Chest wall: No tenderness.   Abdominal:      General: Bowel sounds are normal. There is no distension.      Palpations: Abdomen is soft. There is no mass.      Tenderness: There is no abdominal tenderness. There is no rebound.   Musculoskeletal:         General: Deformity present. No swelling.  Normal range of motion.      Cervical back: Normal range of motion.      Right lower leg: No edema.      Left lower leg: No edema.      Comments: Lower extremity shorter and externally rotated   Skin:     General: Skin is warm and dry.      Capillary Refill: Capillary refill takes less than 2 seconds.      Findings: No rash.   Neurological:      General: No focal deficit present.      Mental Status: He is alert and oriented to person, place, and time.   Psychiatric:         Mood and Affect: Mood normal.         Behavior: Behavior normal.         Assessment/Plan   Active Problems:  There are no active Hospital Problems.    Status post fall with left spiral fracture of the femur, placated by prior left hip replacement and history of prostate cancer with bone metastasis  The patient has been accepted and will have full orthopedic evaluation at [facility] accepted by Dr. Prince     Pancytopenia and anemia of chronic disease exacerbated by blood loss from fracture  The patient will receive 1 unit of packed red blood cells  Will check iron studies,    Chronic thrombocytopenia exacerbated by blood loss  We will continue to monitor    Elevated creatinine kinase  Patient is getting IV hydration, will hold statin medication recheck level in the morning    Hyponatremia likely secondary to pain and poor oral intake  Patient is encouraged to eat he is on normal saline we will recheck level in the morning consider nephrology consult    Coronary artery disease history of PCI and stent  Patient is continued on his routine home medications, except will hold statin for now    Chronic kidney disease stage III with acute kidney injury  The patient is receiving a unit of blood and started on normal saline  Recheck chemistry in the morning if function continues to worsen would request nephrology consultation    Obstructive sleep apnea on CPAP therapy  Patient has his home CPAP and should continue    Hypertension  Appears  controlled on routine medications    Dyslipidemia  We are going to hold his statin medication for now due to his elevated creatinine kinase level    VTE prophylaxis  Protocol scd and lovenox, need to monitor renal fct, platelets  Pt is high risk for VTE         No new Assessment & Plan notes have been filed under this hospital service since the last note was generated.  Service: Internal Medicine          Jesse Yanez, APRN-CNP    Dragon dictation software was used to dictate this note and thus there may be minor errors in translation/transcription including garbled speech or misspellings. Please contact for clarification if needed.